# Patient Record
Sex: MALE | Race: WHITE
[De-identification: names, ages, dates, MRNs, and addresses within clinical notes are randomized per-mention and may not be internally consistent; named-entity substitution may affect disease eponyms.]

---

## 2019-03-28 ENCOUNTER — HOSPITAL ENCOUNTER (EMERGENCY)
Dept: HOSPITAL 75 - ER | Age: 39
Discharge: HOME | End: 2019-03-28
Payer: COMMERCIAL

## 2019-03-28 VITALS — HEIGHT: 68 IN | WEIGHT: 168 LBS | BODY MASS INDEX: 25.46 KG/M2

## 2019-03-28 VITALS — SYSTOLIC BLOOD PRESSURE: 134 MMHG | DIASTOLIC BLOOD PRESSURE: 93 MMHG

## 2019-03-28 DIAGNOSIS — L08.9: ICD-10-CM

## 2019-03-28 DIAGNOSIS — I89.1: ICD-10-CM

## 2019-03-28 DIAGNOSIS — Z91.041: ICD-10-CM

## 2019-03-28 DIAGNOSIS — S61.012A: Primary | ICD-10-CM
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
## 2019-03-28 PROCEDURE — 90715 TDAP VACCINE 7 YRS/> IM: CPT

## 2019-03-28 PROCEDURE — 99284 EMERGENCY DEPT VISIT MOD MDM: CPT

## 2019-03-28 NOTE — XMS REPORT
[facility]

 Created on: 2018



Rocael Purdy

External Reference #: [id number]

: 1980

Sex: Male



Demographics







 Address  [address] [place] [address]  [address]

 

 Preferred Language  Unknown

 

 Marital Status  Unknown

 

 Tenriism Affiliation  Unknown

 

 Race  Unknown

 

 Ethnic Group  Unknown





Author







 Author  FLAQUITO HANCOCK

 

 [facility]

 

 Address  [address] [place]  [address]



 

 Phone  [phone]







Care Team Providers







 Care Team Member Name  Role  Phone

 

 HANCOCK  FLAQUITO  Unavailable  [phone]







PROBLEMS







 Type  Condition  ICD9-CM Code  TZS58-EB Code  Onset Dates  Condition Status  
SNOMED Code

 

 Problem  Attention deficit hyperactivity disorder (ADHD), predominantly 
inattentive type     F90.0     Active  01837224

 

 Problem  Attention deficit hyperactivity disorder (ADHD), combined type     
F90.2     Active  26169891

 

 Problem  Carpal tunnel syndrome of right wrist     G56.01     Active  55376440

 

 Problem  Depression, unspecified depression type     F32.9     Active  20478424







ALLERGIES

No Information



ENCOUNTERS







 Encounter  Location  Date  Diagnosis

 

 [facility]  [address]65[address], [place] [address]
5[address]  04 Oct, 2018   

 

 [facility]  [id number] [place] [address]-
4635  30 Aug, 2018  Attention deficit hyperactivity disorder (ADHD), 
predominantly inattentive type F90.0

 

 [facility]  [address] [id number] [place] [address]  27 Aug
, 2018   

 

 [facility]  [address], [place] [address]  16 Aug, 2018  Attention deficit hyperactivity disorder (ADHD), combined 
type F90.2

 

 [facility]  [address] [facility] [id number] [id number]     

 

 [facility]  [address] [address]  23 May, 
2018  Depression, unspecified depression type F32.9 and Carpal tunnel syndrome 
of right wrist G56.01

 

 [facility]  [id number] [address]    Depression, unspecified depression type F32.9

 

 [facility]  [address] [id number] [address]
1432  14 2015   

 

 [facility]  [address] [facility] [id number] [address]
8[address]     

 

 [facility]  [address] [facility] [id number] [address]
9754  27 May, 2014   

 

 [facility]  [address] [facility] [id number] [address]3659-
8569  27 May, 2014   

 

 [facility]  [address] [facility] [id number] [address]  21 May, 2014   

 

 [facility]  [address] [facility] [id number] [address]163-
6497  21 May, 2014   

 

 [facility]  [address] [facility] [id number][place] [address]
3734  06 May, 2014   

 

 [facility]  [address]65100[place] [address]-
4816  06 May, 2014   

 

 [facility]  [address] [facility] [id number] [address]
5767     

 

 [facility]  [address] [id number] [address]
3886  14 Mar, 2014   

 

 [facility]  [address] [id number], [place] [address]
4122  14 Mar, 2014   







IMMUNIZATIONS

No Known Immunizations



SOCIAL HISTORY

Never Assessed



REASON FOR VISIT

referal



PLAN OF CARE





VITAL SIGNS





MEDICATIONS

Unknown Medications



RESULTS

No Results



PROCEDURES

No Known procedures



INSTRUCTIONS





MEDICATIONS ADMINISTERED

No Known Medications



MEDICAL (GENERAL) HISTORY







 Type  Description  Date

 

 Medical History  depression   

 

 Surgical History  carpal tunnel release bilat  2016   

 

 Surgical History  right knee arthroscopy

## 2019-03-28 NOTE — ED INTEGUMENTARY GENERAL
General


Chief Complaint:  Skin/Wound Problems


Stated Complaint:  L THUMB LAC


Source:  patient


Exam Limitations:  no limitations





History of Present Illness


Date Seen by Provider:  Mar 28, 2019


Time Seen by Provider:  18:49


Initial Comments


Left thumb. This occurred from a small scratch working on his car. Tetanus is 

not up-to-date. Initial injury was this morning. Over the day, thumbs become 

red swollen and tender.


Timing/Duration:  this morning


Severity:  mild


Associated Symptoms:  denies symptoms; No fever





Allergies and Home Medications


Allergies


Coded Allergies:  


     iodine (Verified  Allergy, Unknown, 3/28/19)





Patient Home Medication List


Home Medication List Reviewed:  Yes





Review of Systems


Review of Systems


Constitutional:  see HPI; No chills, No fever


EENTM:  see HPI


Respiratory:  no symptoms reported


Cardiovascular:  no symptoms reported


Genitourinary:  no symptoms reported


Musculoskeletal:  no symptoms reported


Skin:  see HPI


Psychiatric/Neurological:  No Symptoms Reported


Endocrine:  No Symptoms Reported


Hematologic/Lymphatic:  No Symptoms Reported





Past Medical-Social-Family Hx


Patient Social History


Alcohol Use:  Denies Use


Recreational Drug Use:  No


Smoking Status:  Never a Smoker


Recent Foreign Travel:  No


Contact w/Someone Who Travel:  No


Recent Hopitalizations:  No





Immunizations Up To Date


Tetanus Booster (TDap):  More than 5yrs





Seasonal Allergies


Seasonal Allergies:  No





Past Medical History


Surgeries:  Yes


Orthopedic


Respiratory:  No


Cardiac:  No


Neurological:  No


Genitourinary:  No


Gastrointestinal:  No


Musculoskeletal:  No


Endocrine:  No


HEENT:  Yes


Cancer:  No


Integumentary:  No





Physical Exam


Vital Signs


Capillary Refill :


General Appearance:  WD/WN, no apparent distress


HEENT:  PERRL/EOMI, normal ENT inspection


Neck:  non-tender, full range of motion


Respiratory:  no respiratory distress, no accessory muscle use


Neurologic/Psychiatric:  alert, normal mood/affect, oriented x 3


Skin:  normal color, warm/dry


Skin Problem Location:  upper extremities


Skin Problem Character:  other (there is a 1 cm laceration to the side of the 

proximal phalanx left thumb. This is shallow, wound edges are gaping by about 1-

2 mm. There is about 2 mm of ecchymosis surrounding this. There is erythema 

over this area as well as the thenar eminence, there is lymphangitis extending 

up the volar aspect of the left forearm to the left antecubital fossa region.)





Progress/Results/Core Measures


Results/Orders


My Orders





Orders - GREY SOLANO


Lidocaine 4% Topical (Xylocaine Topical (3/28/19 19:00)


Dipht,Pertuss(Acell),Tet Adult (Boostrix (3/28/19 19:00)


Ceftriaxone For Im Use (Rocephin For Im (3/28/19 19:00)


Lidocaine 1% Inj 20 Ml (Xylocaine 1% Inj (3/28/19 19:00)








Departure


Impression





 Primary Impression:  


 Wound infection


 Additional Impression:  


 Lymphangitis


Disposition:  01 HOME, SELF-CARE


Condition:  Stable





Departure-Patient Inst.


Decision time for Depature:  18:51


Referrals:  


[facility] (PCP)


Primary Care Physician








DO HINES (Family)


Primary Care Physician


Patient Instructions:  Wound Care, Wound Infection





Add. Discharge Instructions:  


1. Follow-up with your doctor on Monday for recheck. Return to ER for any 

increasing redness fevers chills or other concerns as if this significantly 

worsens, this may warrant intravenous antibiotics. Start the antibiotics 

tomorrow morning. All discharge instructions reviewed with patient and/or 

family. Voiced understanding.


Scripts


Cephalexin (Keflex) 500 Mg Capsule


500 MG PO Q6H, #28 CAP


   Prov: GREY SOLANO         3/28/19











GREY SOLANO Mar 28, 2019 18:52

## 2019-03-28 NOTE — XMS REPORT
[facility]

 Created on: 2018



PurdyRocael

External Reference #: [id number]

: 1980

Sex: Male



Demographics







 Address  [address] [place] [address]  [address]

 

 Preferred Language  Unknown

 

 Marital Status  Unknown

 

 Religion Affiliation  Unknown

 

 Race  Unknown

 

 Ethnic Group  Unknown





Author







 Author  URI LERNER

 

 Organization  [facility]

 

 Address  [address] [place]  [address]



 

 Phone  [phone]







Care Team Providers







 Care Team Member Name  Role  Phone

 

 ANEUDY LERNERWIN  Unavailable  [phone]







PROBLEMS







 Type  Condition  ICD9-CM Code  OJF89-DB Code  Onset Dates  Condition Status  
SNOMED Code

 

 Problem  Attention deficit hyperactivity disorder (ADHD), predominantly 
inattentive type     F90.0     Active  98180090

 

 Problem  Attention deficit hyperactivity disorder (ADHD), combined type     
F90.2     Active  45152250

 

 Problem  Carpal tunnel syndrome of right wrist     G56.01     Active  62306972

 

 Problem  Depression, unspecified depression type     F32.9     Active  97815386







ALLERGIES

No Information



ENCOUNTERS







 Encounter  Location  Date  Diagnosis

 

 [facility]  [address]00565100[place] [address]  14 Sep, 2018  Attention deficit hyperactivity disorder (ADHD), 
predominantly inattentive type F90.0

 

 [facility]  [address] [place] [address]-
0221  07 Sep, 2018   

 

 [place] [address]00565100[place] [address]-
3256  30 Aug, 2018  Attention deficit hyperactivity disorder (ADHD), 
predominantly inattentive type F90.0

 

 [facility]  [address] [id number] [place] [address]  27 Aug
, 2018   

 

 [facility]  [address] [facility] [id number] [place] [address]
1825  16 Aug, 2018  Attention deficit hyperactivity disorder (ADHD), combined 
type F90.2

 

 [facility]  [address] [facility] [id number] [address]     

 

 [facility]  [address] [facility] [id number][place] [address]  23 May, 
2018  Depression, unspecified depression type F32.9 and Carpal tunnel syndrome 
of right wrist G56.01

 

 [facility]  [address] [facility] [id number][place] [address]    Depression, unspecified depression type F32.9

 

 [facility]  [address]00565100[place] [address]-
7238     

 

 [facility]  [address] [id number] [address]-
[address]38     

 

 [facility]  [address] [id number] [address]-
1141  27 May, 2014   

 

 [facility]  [address] [id number] [address]
3560  27 May, 2014   

 

 [facility]  [address]00565100[place] [address]-
7423  21 May, 2014   

 

 [facility]  [address]00565100[place] [address]-
4939  21 May, 2014   

 

 [facility]  [address]00565100[place] [address]-
2151  06 May, 2014   

 

 [facility]  [address] [id number] [address]-
7472  06 May, 2014   

 

 [facility]  [address] [id number] [address]-
1402     

 

 [facility]  [address] [id number] [address]-
4325  14 Mar, 2014   

 

 [facility]  [address] [id number] [address]-
0194  14 Mar, 2014   







IMMUNIZATIONS

No Known Immunizations



SOCIAL HISTORY

Never Assessed



REASON FOR VISIT

 intake



PLAN OF CARE







 Activity  Details









  









 Follow Up  prn Reason:







VITAL SIGNS





MEDICATIONS

Unknown Medications



RESULTS

No Results



PROCEDURES







 Procedure  Date Ordered  Result  Body Site

 

 Psych diagnostic evaluation, established patient  Aug 16, 2018      







INSTRUCTIONS





MEDICATIONS ADMINISTERED

No Known Medications



MEDICAL (GENERAL) HISTORY







 Type  Description  Date

 

 Medical History  depression   

 

 Surgical History  carpal tunnel release bilat  2016   

 

 Surgical History  right knee arthroscopy  2015

## 2019-03-28 NOTE — XMS REPORT
[facility]

 Created on: 2018



MendezRocael

External Reference #: [id number]

: 1980

Sex: Male



Demographics







 Address  [address] [place] [address]  [address]

 

 Preferred Language  Unknown

 

 Marital Status  Unknown

 

 Spiritism Affiliation  Unknown

 

 Race  Unknown

 

 Ethnic Group  Unknown





Author







 Author  FLAQUITO HANCOCK

 

 [facility]

 

 Address  [address] [place]  [address]



 

 Phone  [phone]







Care Team Providers







 Care Team Member Name  Role  Phone

 

 FLAQUITO HANCOCK  Unavailable  [phone]







PROBLEMS







 Type  Condition  ICD9-CM Code  YOR83-GG Code  Onset Dates  Condition Status  
SNOMED Code

 

 Problem  Carpal tunnel syndrome of right wrist     G56.01     Active  69741623

 

 Problem  Depression, unspecified depression type     F32.9     Active  01365816

 

 Problem  Dental examination  V72.2        Active  24677859







ALLERGIES







 Substance  Reaction  Event Type  Date  Status

 

 Iodine  anaphylaxis  Drug Allergy    Active







ENCOUNTERS







 Encounter  Location  Date  Diagnosis

 

 [place] [address] [address] [address]-
[address]  16 Aug, 2018   

 

 [facility]  [address] [id number]  06 Aug, 
2018   

 

 [facility]  [address] [id number]     

 

 [facility]  [address] [id number]  23 May, 
2018  Depression, unspecified depression type F32.9 and Carpal tunnel syndrome 
of right wrist G56.01

 

 [facility]  [address] [id number]    Depression, unspecified depression type F32.9

 

 [facility]  [id number] [place] [address]-
[address]     

 

 [facility]  [address] [address]-
[address]     

 

 [facility]  [address] [address]-
0785  27 May, 2014   

 

 [facility]  [address] [address]-
9226  27 May, 2014   

 

 [facility]  [address] [facility] [id number] [address]-
4606  21 May, 2014   

 

 [facility]  [address] [id number] [address]-
2816  21 May, 2014   

 

 [facility]  [address] [facility] [id number][place] [address]-
9896  06 May, 2014   

 

 [facility]  [address] [id number] [address]-
3266  06 May, 2014   

 

 [facility]  [address] [facility] [id number][place] [address]-
7753     

 

 [facility]  [address] [id number] [address]-
3386  14 Mar, 2014   

 

 [facility]  [address] [id number] [address]-
6306  14 Mar, 2014   







IMMUNIZATIONS

No Known Immunizations



SOCIAL HISTORY

Never Assessed



REASON FOR VISIT

Depression, needs to establish care, but wants to start back on depression med 
today Austin SYLVESTER



PLAN OF CARE







 Activity  Details









  









 Follow Up  4 Weeks Reason:depression







VITAL SIGNS







 Height  69 in  2018

 

 Weight  168.6 lbs  2018

 

 Temperature  98.1 degrees Fahrenheit  2018

 

 Heart Rate  88 bpm  2018

 

 Respiratory Rate  18   2018

 

 BMI  24.90 kg/m2  2018

 

 Blood pressure systolic  102 mmHg  2018

 

 Blood pressure diastolic  72 mmHg  2018







MEDICATIONS







 Medication  Instructions  Dosage  Frequency  Start Date  End Date  Duration  
Status

 

 Fluoxetine 20 mg  Orally Once a day  1 capsule in the morning  24h       30 day(s)  Active







RESULTS

No Results



PROCEDURES

No Known procedures



INSTRUCTIONS





MEDICATIONS ADMINISTERED

No Known Medications



MEDICAL (GENERAL) HISTORY







 Type  Description  Date

 

 Medical History  depression   

 

 Surgical History  carpal tunnel release bilat  2016   

 

 Surgical History  right knee arthroscopy

## 2019-03-28 NOTE — XMS REPORT
[facility]

 Created on: 2018



PurdyRocael

External Reference #: [id number]

: 1980

Sex: Male



Demographics







 Address  [address] [place] [address]  [address]

 

 Preferred Language  Unknown

 

 Marital Status  Unknown

 

 Episcopal Affiliation  Unknown

 

 Race  Unknown

 

 Ethnic Group  Unknown





Author







 Author  URI LERNER

 

 Organization  [facility]

 

 Address  [address] [place]  [address]



 

 Phone  [phone]







Care Team Providers







 Care Team Member Name  Role  Phone

 

 ANEUDY LERNERWIN  Unavailable  [phone]







PROBLEMS







 Type  Condition  ICD9-CM Code  XCG61-QC Code  Onset Dates  Condition Status  
SNOMED Code

 

 Problem  Attention deficit hyperactivity disorder (ADHD), predominantly 
inattentive type     F90.0     Active  78982165

 

 Problem  Attention deficit hyperactivity disorder (ADHD), combined type     
F90.2     Active  86751644

 

 Problem  Carpal tunnel syndrome of right wrist     G56.01     Active  80843253

 

 Problem  Depression, unspecified depression type     F32.9     Active  31725355







ALLERGIES

No Information



ENCOUNTERS







 Encounter  Location  Date  Diagnosis

 

 [facility]  [address]00565100[place] [address]
0593  14 Sep, 2018  Attention deficit hyperactivity disorder (ADHD), 
predominantly inattentive type F90.0

 

 [facility]  [address] [place] [address]-
2494  07 Sep, 2018   

 

 [place] [address]00565100[place] [address]
8268  30 Aug, 2018  Attention deficit hyperactivity disorder (ADHD), 
predominantly inattentive type F90.0

 

 [facility]  [address] [id number] [place] [address]  27 Aug
, 2018   

 

 [facility]  [address] [facility] [id number][place] [address]-
7809  16 Aug, 2018  Attention deficit hyperactivity disorder (ADHD), combined 
type F90.2

 

 [facility]  [address] [facility] [id number] [place] [address]     

 

 [facility]  [address] [facility] [id number] Mountain [place] [address]  23 May, 
2018  Depression, unspecified depression type F32.9 and Carpal tunnel syndrome 
of right wrist G56.01

 

 [facility]  [address] [facility] [id number] [place] [address]    Depression, unspecified depression type F32.9

 

 [facility]  [address]00565100[place], [place] [address]-
[address]     

 

 [facility]  [address] [id number] [address]-
3246     

 

 [facility]  [address]00565100[place], [place] [address]-
7247  27 May, 2014   

 

 [facility]  [address] [id number] [address]  27 May, 2014   

 

 [facility]  [address]00565100[place], [place] [address]
0694  21 May, 2014   

 

 [facility]  [address]00565100[place], [place] [address]-
1595  21 May, 2014   

 

 [facility]  [address]00565100[place], [place] [address]-
5007  06 May, 2014   

 

 [facility]  [address] [id number], [place] [address]-
5282  06 May, 2014   

 

 [facility]  [address] [id number] [address]-
8661     

 

 [facility]  [address] [id number] [address]-
6519  14 Mar, 2014   

 

 [facility]  [address] [id number], [place] [address]-
0765  14 Mar, 2014   







IMMUNIZATIONS

No Known Immunizations



SOCIAL HISTORY

Never Assessed



REASON FOR VISIT

ADHD eval. 



PLAN OF CARE





VITAL SIGNS





MEDICATIONS

Unknown Medications



RESULTS

No Results



PROCEDURES

No Known procedures



INSTRUCTIONS





MEDICATIONS ADMINISTERED

No Known Medications



MEDICAL (GENERAL) HISTORY







 Type  Description  Date

 

 Medical History  depression   

 

 Surgical History  carpal tunnel release bilat  2016   

 

 Surgical History  right knee arthroscopy

## 2019-03-28 NOTE — XMS REPORT
[facility]

 Created on: 2018



Rocael Purdy

External Reference #: [id number]

: 1980

Sex: Male



Demographics







 Address  [address] [place] [address]  [address]

 

 Preferred Language  Unknown

 

 Marital Status  Unknown

 

 Jewish Affiliation  Unknown

 

 Race  Unknown

 

 Ethnic Group  Unknown





Author







 Author  DO HINES

 

 Organization  [facility]

 

 Address  [address] [place]  [address]



 

 Phone  [phone]







Care Team Providers







 Care Team Member Name  Role  Phone

 

 DO HINES  Unavailable  [phone]







PROBLEMS







 Type  Condition  ICD9-CM Code  MOA04-GQ Code  Onset Dates  Condition Status  
SNOMED Code

 

 Problem  Attention deficit hyperactivity disorder (ADHD), predominantly 
inattentive type     F90.0     Active  14326628

 

 Problem  Attention deficit hyperactivity disorder (ADHD), combined type     
F90.2     Active  58432899

 

 Problem  Carpal tunnel syndrome of right wrist     G56.01     Active  37069079

 

 Problem  Depression, unspecified depression type     F32.9     Active  55296263







ALLERGIES

No Information



ENCOUNTERS







 Encounter  Location  Date  Diagnosis

 

 [facility]  [id number], [place] [address]  04 Dec, 2018   

 

 [facility]  [address] [place] [address]
7[address]  14 Sep, 2018  Attention deficit hyperactivity disorder (ADHD), 
predominantly inattentive type F90.0

 

 [facility]  [address], [place] [address]
0963  07 Sep, 2018   

 

 [facility]  [address], [place] [address]  30 Aug, 2018  Attention deficit hyperactivity disorder (ADHD), 
predominantly inattentive type F90.0

 

 [facility] [address] [id number] [place] [address]  27 Aug
, 2018   

 

 [facility]  [address] [facility] [id number] [address]  16 Aug, 2018  Attention deficit hyperactivity disorder (ADHD), combined 
type F90.2

 

 [facility]  [address] [facility] [id number] [id number]     

 

 [facility]  [address] [facility] [id number] [id number]  23 May, 
2018  Depression, unspecified depression type F32.9 and Carpal tunnel syndrome 
of right wrist G56.01

 

 [facility]  [address] [id number] [id number]    Depression, unspecified depression type F32.9

 

 [facility]  [id number] [address]     

 

 [facility]  [address]65[address], [place] [address]     

 

 [facility]  [address]65[address], [place] [address]
1604  27 May, 2014   

 

 [facility]  [address] [address]
1576  27 May, 2014   

 

 [facility]  [address]65100[place], [place] [address]  21 May, 2014   

 

 [facility]  [address]65100[place], [place] [address]
7012  21 May, 2014   

 

 [facility]  [address]65100[place], [place] [address]  06 May, 2014   

 

 [facility]  [address]00565100[place], [place] [address]
1509  06 May, 2014   

 

 [facility]  [address] [id number], [place] [address]85     

 

 [facility]  [address]65100[place], [place] [address]
27[address]  14 Mar, 2014   

 

 [facility]  [id number], [place] [address]
3484  14 Mar, 2014   







IMMUNIZATIONS

No Known Immunizations



SOCIAL HISTORY

Never Assessed



REASON FOR VISIT

Medication refill request



PLAN OF CARE





VITAL SIGNS





MEDICATIONS







 Medication  Instructions  Dosage  Frequency  Start Date  End Date  Duration  
Status

 

 Wellbutrin  MG  Orally 2 times a day  1 tablet  12h  19 Sep, 2018     30 
day(s)  Active







RESULTS

No Results



PROCEDURES

No Known procedures



INSTRUCTIONS





MEDICATIONS ADMINISTERED

No Known Medications



MEDICAL (GENERAL) HISTORY







 Type  Description  Date

 

 Medical History  depression   

 

 Surgical History  carpal tunnel release bilat  2016   

 

 Surgical History  right knee arthroscopy

## 2019-03-28 NOTE — XMS REPORT
[facility]

 Created on: 2018



RajwinderDennyRocael

External Reference #: [id number]

: 1980

Sex: Male



Demographics







 Address  [address] [place] [address]  [address]

 

 Preferred Language  Unknown

 

 Marital Status  Unknown

 

 Mandaeism Affiliation  Unknown

 

 Race  Unknown

 

 Ethnic Group  Unknown





Author







 Author  FLAQUITO HANCOCK

 

 [facility]

 

 Address  [address] [place]  [address]



 

 Phone  [phone]







Care Team Providers







 Care Team Member Name  Role  Phone

 

 HANCOCK  FLAQUITO  Unavailable  [phone]







PROBLEMS







 Type  Condition  ICD9-CM Code  YXM80-XQ Code  Onset Dates  Condition Status  
SNOMED Code

 

 Problem  Attention deficit hyperactivity disorder (ADHD), combined type     
F90.2     Active  45249892

 

 Problem  Carpal tunnel syndrome of right wrist     G56.01     Active  41254974

 

 Problem  Depression, unspecified depression type     F32.9     Active  81897882

 

 Problem  Dental examination  V72.2        Active  46556890







ALLERGIES







 Substance  Reaction  Event Type  Date  Status

 

 Iodine  anaphylaxis  Drug Allergy  23 May, 2018  Active







ENCOUNTERS







 Encounter  Location  Date  Diagnosis

 

 [address] [id number] [place] [address]-
3564  30 Aug, 2018   

 

 [place] [address] [address]
65[address]  16 Aug, 2018  Attention deficit hyperactivity disorder (ADHD), combined 
type F90.2

 

 [facility]  [id number] [place] [id number]     

 

 [facility]  [id number] [place] [id number]  23 May, 
2018  Depression, unspecified depression type F32.9 and Carpal tunnel syndrome 
of right wrist G56.01

 

 [facility]  [id number] [place] [id number]    Depression, unspecified depression type F32.9

 

 [facility]  [address] [address]     

 

 [facility]  [address] [address]     

 

 [facility]  [address] [address]-
55[address]  27 May, 2014   

 

 [facility]  [address] [facility] [id number] [address]-
2546  27 May, 2014   

 

 [facility]  [address] [id number] [address]
2546  21 May, 2014   

 

 [facility]  [address] [id number] [address]
2546  21 May, 2014   

 

 [facility]  [address]65100[place] [address]
2546  06 May, 2014   

 

 [facility]  [address] [id number] [address]-
2546  06 May, 2014   

 

 [facility]  [address] [id number] [address]-
2546     

 

 [facility]  [address] [id number] [address]-
2546  14 Mar, 2014   

 

 [facility]  [address] [id number] [address]-
2546  14 Mar, 2014   







IMMUNIZATIONS

No Known Immunizations



SOCIAL HISTORY

Never Assessed



REASON FOR VISIT

1 month follow up on depression, doing well with medication. antonio Archibald, Also 
started new job and having issues with right wrist pain. Has HX of carpal 
tunnel. 



PLAN OF CARE







 Activity  Details









  









 Follow Up  2 Months Reason:depression







VITAL SIGNS







 Height  69 in  2018

 

 Weight  158 lbs  2018

 

 Temperature  97.5 degrees Fahrenheit  2018

 

 Heart Rate  74 bpm  2018

 

 Respiratory Rate  16   2018

 

 BMI  23.33 kg/m2  2018

 

 Blood pressure systolic  110 mmHg  2018

 

 Blood pressure diastolic  76 mmHg  2018







MEDICATIONS







 Medication  Instructions  Dosage  Frequency  Start Date  End Date  Duration  
Status

 

 Ibuprofen 800 MG  Orally Three times a day  1 tablet with food or milk as 
needed  8h  23 May, 2018        Active

 

 Fluoxetine 20 mg  Orally Once a day  1 capsule in the morning  24h          Active







RESULTS

No Results



PROCEDURES

No Known procedures



INSTRUCTIONS





MEDICATIONS ADMINISTERED

No Known Medications



MEDICAL (GENERAL) HISTORY







 Type  Description  Date

 

 Medical History  depression   

 

 Surgical History  carpal tunnel release bilat  2016   

 

 Surgical History  right knee arthroscopy

## 2019-03-28 NOTE — XMS REPORT
[facility]

 Created on: 2018



PurdyDennyRocael

External Reference #: [id number]

: 1980

Sex: Male



Demographics







 Address  [address] [place] [address]  [address]

 

 Preferred Language  Unknown

 

 Marital Status  Unknown

 

 Sabianism Affiliation  Unknown

 

 Race  Unknown

 

 Ethnic Group  Unknown





Author







 Author  DO HINES

 

 Organization  [facility]

 

 Address  [address] [place]  [address]



 

 Phone  [phone]







Care Team Providers







 Care Team Member Name  Role  Phone

 

 DO HINES  Unavailable  [phone]







PROBLEMS







 Type  Condition  ICD9-CM Code  RFV54-XC Code  Onset Dates  Condition Status  
SNOMED Code

 

 Problem  Attention deficit hyperactivity disorder (ADHD), predominantly 
inattentive type     F90.0     Active  02124483

 

 Problem  Attention deficit hyperactivity disorder (ADHD), combined type     
F90.2     Active  76718704

 

 Problem  Carpal tunnel syndrome of right wrist     G56.01     Active  40646655

 

 Problem  Depression, unspecified depression type     F32.9     Active  85587572







ALLERGIES

No Information



ENCOUNTERS







 Encounter  Location  Date  Diagnosis

 

 [facility]  [address]00565100[place], [place] [address]  14 Sep, 2018  Attention deficit hyperactivity disorder (ADHD), 
predominantly inattentive type F90.0

 

 [facility]  [address]00565100[place], [place] [address]
2244  07 Sep, 2018   

 

 [facility]  [address] [id number] [address]
1129  30 Aug, 2018  Attention deficit hyperactivity disorder (ADHD), 
predominantly inattentive type F90.0

 

 [facility] [address] [id number] [place] [address]  27 Aug
, 2018   

 

 [facility]  [address] [facility] [id number], [place] [address]-
7873  16 Aug, 2018  Attention deficit hyperactivity disorder (ADHD), combined 
type F90.2

 

 [facility]  [address] [id number] [id number]     

 

 [facility]  [address] [facility] [id number] [id number]  23 May, 
2018  Depression, unspecified depression type F32.9 and Carpal tunnel syndrome 
of right wrist G56.01

 

 [facility]  [address] [facility] [id number] [id number]    Depression, unspecified depression type F32.9

 

 [facility]  [address]00565100[place], [place] [address]-
[address]     

 

 [facility]  [address] [id number], [place] [address]     

 

 [facility]  [address]00565100[place], [place] [address]-
4872  27 May, 2014   

 

 [facility]  [address] [id number] [address]-
5591  27 May, 2014   

 

 [facility]  [address]00565100[place], [place] [address]-
7459  21 May, 2014   

 

 [facility]  [address]00565100[place], [place] [address]-
4275  21 May, 2014   

 

 [facility]  [address]00565100[place], [place] [address]-
8516  06 May, 2014   

 

 [facility]  [address] [id number], [place] [address]-
6345  06 May, 2014   

 

 [facility]  [address] [id number], [place] [address]-
25[address]     

 

 [facility]  [address] [id number], [place] [address]-
8373  14 Mar, 2014   

 

 [facility]  [address] [id number] [address]-
4481  14 Mar, 2014   







IMMUNIZATIONS

No Known Immunizations



SOCIAL HISTORY

Never Assessed



REASON FOR VISIT

Medication question



PLAN OF CARE





VITAL SIGNS





MEDICATIONS

Unknown Medications



RESULTS

No Results



PROCEDURES

No Known procedures



INSTRUCTIONS





MEDICATIONS ADMINISTERED

No Known Medications



MEDICAL (GENERAL) HISTORY







 Type  Description  Date

 

 Medical History  depression   

 

 Surgical History  carpal tunnel release bilat  2016   

 

 Surgical History  right knee arthroscopy

## 2019-03-28 NOTE — XMS REPORT
Continuity of Care Document

 Created on: 2019



LLUVIA ELKINS

External Reference #: [id number]

: 1980

Sex: Male



Demographics







 Home Phone  [phone]

 

 Preferred Language  Unknown

 

 Marital Status  Unknown

 

 Adventism Affiliation  Unknown

 

 Race  Unknown

 

 Ethnic Group  Unknown





Author







 Author  [facility]

 

 Organization  [facility]

 

 Address  Unknown

 

 Phone  Unavailable



              



Allergies

      





 Active            Description            Code            Type            
Severity            Reaction            Onset            Reported/Identified   
         Relationship to Patient            Clinical Status        

 

 Yes            acetaminophen            1605            Drug Allergy          
  N/A            N/A                                                   
Confirmed or Verified        

 

 Yes            hydrocodone            1554            Drug Allergy            N
/A            N/A                                                   Confirmed 
or Verified        

 

 Yes            iodine            852            Drug Allergy            Severe
            Hives                                                            

 

 Yes            iodine            852            Drug Allergy            N/A   
         N/A                                                   Confirmed or 
Verified        

 

 Yes            Vicodin            3413            Drug Allergy            
Severe            Hives                                                        
    

 

 Yes            iodine                         Drug Allergy            N/A     
       N/A                         2014                                  



                            



Medications

      



There is no data.                  



Problems

      





 Date Dx Coded            Attending            Type            Code            
Diagnosis            Diagnosed By        

 

 2014            DEYANIRA DDS, DEBRA B                         V72.2       
     DENTAL EXAMINATION                     

 

 2014            DEYANIRA DDS, DEBRA B                         V72.2       
     DENTAL EXAMINATION                     

 

 2014            DEYANIRA DDS, DEBRA B                         V72.2       
     DENTAL EXAMINATION                     

 

 2014            DEYANIRA DDS, DEBRA B                         V72.2       
     DENTAL EXAMINATION                     

 

 2014            DEYANIRA DDS, DEBRA B                         V72.2       
     DENTAL EXAMINATION                     

 

 2014            DEYANIRA DDS, DEBRA B                         V72.2       
     DENTAL EXAMINATION                     

 

 2014            DEYANIRA DDS, DEBRA B                         V72.2       
     DENTAL EXAMINATION                     

 

 2014            DEYANIRA DDS, DEBRA B                         V72.2       
     DENTAL EXAMINATION                     

 

 2014            DEYANIRA DDS, DEBRA B                         V72.2       
     DENTAL EXAMINATION                     

 

 2014            DEYANIRA DDS, DEBRA B                         V72.2       
     DENTAL EXAMINATION                     

 

 2014            DEYANIRA DDS, DEBRA B                         V72.2       
     DENTAL EXAMINATION                     

 

 2014            DEYANIRA DDS, DEBRA B                         V72.2       
     DENTAL EXAMINATION                     

 

 2014                         F            719.46            Pain, knee  
                   

 

 2014                         F            727.83            PLICA 
SYNDROME                     

 

 2014                         F            V54.89            OTHER 
ORTHOPEDIC AFTERCARE                     

 

 2015                         F            719.46            Pain, knee  
                   

 

 2015                         F            728.89            OTHER 
DISORDER OF MUSCLE, LIGAMENT, AND FASCIA                     



                                                  



Procedures

      





 Code            Description            Performed By            Performed On   
     

 

                                               LIMIT ORAL EVAL PROBLM 
FOCUS                                   2014        

 

                                               INTRAORAL PERIAPICAL FIRST 
F                                   2014        

 

                                               EXTRACTION ERUPTED TOOTH/
EXR                                   2014        

 

                                               NO CHARGE/FOLLOW UP        
                           2014        

 

                                               EXTRACTION ERUPTED TOOTH/
EXR                                   2014        

 

                                               NO CHARGE/FOLLOW UP        
                           2014        

 

             96971                                  ROUTINE VENIPUNCTURE       
                            2015        

 

             85079                                  METABOLIC PANEL TOTAL CA   
                                2015        

 

             06741                                  COMPLETE CBC W/AUTO DIFF 
WBC                                   2015        



                                  



Results

      





 Test            Result            Range        









 CBC WITH DIFF - 06/04/15 00:00         









 BASO%            0.2 %            0-2        

 

 EOS%            3.4 %            0-7.0        

 

 HCT            35.8 %            41.9-52.0        

 

 HGB            12.8 G/DL            13.0-18.0        

 

 LYMPH%            25.4 %            20-40        

 

 MCH            31.4 PG            27-31        

 

 MCHC            35.8 G/DL            33-37        

 

 MCV            87.7 FL            80-94        

 

 MONO%            10.5 %            0-10.0        

 

 MPV            9.8 FL            7.3-10.4        

 

 NEUTRO%            60.5 %            40-70        

 

 PLT            250 10^3u            130-400        

 

 RBC            4.1 10^6u            4.7-6.1        

 

 RDW            12.4 %            11.5-15.5        

 

 WBC            6.1 10^3u            4.8-10.8        

 

 NEUTRO#            3.7 10^3u            1.5-7.5        

 

 LYMPH#            1.6 10^3u            0.9-4.0        

 

 MONO#            0.6 10^3u            0-0.8        

 

 EOS#            0.2 10^3u            0-0.6        

 

 BASO#            0.0 10^3u            0-0.1        









 BMP - 06/04/15 00:00         









 BCR            13.2             10-20        

 

 BUN            16 MG/DL            7-18        

 

 CA            9.0 MG/DL            8.4-10.2        

 

 CL            103 MEQ/L                    

 

 CO2            31.6 MEQ/L            22-28        

 

 CREA            1.21 MG/DL            0.6-1.3        

 

 EGFR            69 eGFR            >=60        

 

 GLU            109 MG/DL                    

 

 K            3.7 MEQ/L            3.5-5.1        

 

 NA            143 MEQ/L            134-145        

 

 OSMSC            286.7 MOSML            280-300        

 

 Anion Gap            8.4             8-16        



                  



Encounters

      





 ACCT No.            Visit Date/Time            Discharge            Status    
        Pt. Type            Provider            Facility            Loc./Unit  
          Complaint        

 

 [id number]            2015 06:40:00            2015 09:00:00         
   DIS            Inpatient            DO LE            [facility]            OPS                     

 

 [id number]            2014 00:00:00                                   
   Document Registration                                                       
     

 

 [id number]            2015 08:00:00                                      
Document Registration                                                          
  

 

 [id number]            2014 10:45:00                                      
Document Registration                                                          
  

 

 [id number]            2014 15:25:00            2014 23:59:59          
  CLS            Outpatient            DEBRA MCMILLAN DDS                     
                          

 

 [id number]            2014 15:38:00            2014 23:59:59          
  CLS            Outpatient            DEBRA MCMILLAN [facility]                     
                          

 

 [id number]            2014 10:53:00            2014 23:59:59          
  CLS            Outpatient            DEBRA MCMILLAN D[facility]                     
                          

 

 [id number]            2019 09:00:00            2019 23:59:59            
CLS            Outpatient            DO CRUZ                      
   [facility]

## 2019-03-28 NOTE — XMS REPORT
Transition of Care Document

 Created on: 06/15/2015



LLUVIA ELKINS

External Reference #: [id number]

: 1980

Sex: Male



Demographics







 Address  [address] [place]  [address]

 

 Home Phone  [phone]

 

 Preferred Language  English

 

 Marital Status  Unknown

 

 Presybeterian Affiliation  Unknown

 

 Race  White

 

 Ethnic Group  Not  or 





Author







 Author  RANJANA"[employer] [facility] Staff

 

 Organization  [facility]

 

 Address  [address], [place]  [address]



 

 Phone  [phone]







Summary purpose

TRANSITION OF CARE AUTO GENERATION



Chief Complaint and Reason for Visit







 Admit Diagnosis 1  CARPAL TUNNEL SYNDROME







Problem list

No authorized problems tracked for continuity of care are available for this 
visit.



Encounters

No authorized problems tracked for encounter diagnoses are available for this 
visit.



Medications

No medications recorded for this patient visit



Allergies, adverse reactions, alerts







 Allergen  Category  Ingredient  Status  Reaction  Severity  Onset

 

 iodine  Drug Allergy  iodine  Confirmed or Verified  Hives  Severe  Adult

 

 Vicodin  Drug Allergy  Vicodin  Confirmed or Verified  Hives  Severe  Adult

 

 Vicodin  Drug Allergy  acetaminophen  Confirmed or Verified  Hives  Severe  
Adult

 

 Vicodin  Drug Allergy  hydrocodone  Confirmed or Verified  Hives  Severe  Adult







Immunizations

No immunizations recorded for this patient visit



Relevant diagnostic tests and/or laboratory data







 RESULTS 

 

 Chemistry 

 

 49-95-352041:55:00 

 

     Result  Normal Range  Units

 

 Sodium    143  134-145  mEq/l

 

 Potassium    3.7  3.5-5.1  mEq/l

 

 Chloride    103    mEq/l

 

 CO2  H  31.6  22-28  mEq/l

 

 Glucose  H  109    mg/dl

 

 BUN    16  7-18  mg/dl

 

 Creatinine    1.21  0.6-1.3  mg/dl

 

 Calcium    9.0  8.4-10.2  mg/dl

 

 Osmolality    286.7  280-300  mOsm/L

 

 Anion GAP    8.4  8-16  

 

 BUN/Creatinine Ratio    13.2  10-20  

 

 Estimated GFR    69  >=60  mL/min/1.7

 

  

 

 Hematology 

 

 87-20-190915:55:00 

 

     Result  Normal Range  Units

 

 WBC    6.1  4.8-10.8  103/uL

 

 RBC  L  4.1  4.7-6.1  106/uL

 

 HGB  L  12.8  13.0-18.0  g/dl

 

 HCT  L  35.8  41.9-52.0  %

 

 MCV    87.7  80-94  FL

 

 MCH  H  31.4  27-31  pg

 

 MCHC    35.8  33-37  g/dl

 

 RDW    12.4  11.5-15.5  %

 

 PLT    250  130-400  103/uL

 

 MPV    9.8  7.3-10.4  FL

 

 Neutro %    60.5  40-70  %

 

 Lymph %    25.4  20-40  %

 

 Mono %  H  10.5  0-10.0  %

 

 Eos %    3.4  0-7.0  %

 

 Baso %    0.2  0-2  %

 

 Neutro #    3.7  1.5-7.5  103/uL

 

 Lymph #    1.6  0.9-4.0  103/uL

 

 Mono #    0.6  0-0.8  103/uL

 

 Eos #    0.2  0-0.6  103/uL

 

 Baso #    0.0  0-0.1  103/uL

 

  

 

 Radiology Results 

 

 76-89-181018:55:00 

 

     Result  Normal Range  Units

 

 MPV    9.8  7.3-10.4  FL







History of procedures







 Procedure Code  Code Type  Description  Date Performed  Performing Physician

 

 .43  ICD9-CM  CARPAL TUNNEL RELEASE  2015   

 

 41343  CPT-4  CARPAL TUNNEL SURGERY  2015  DO LE

 

   CPT-4  CEFAZOLIN SODIUM INJECTION  2015  DO LE

 

   CPT-4  INJ MIDAZOLAM HYDROCHLORIDE  2015  DO LE

 

   CPT-4  INJ, PROPOFOL, 10 MG  2015  DO LE

 

   CPT-4  FENTANYL CITRATE INJECITON  2015  DO LE

 

   CPT-4  RINGERS LACTATE INFUSION  2015  DO LE







Functional status







 Functional Status  Finding  Observation Time

 

 Hearing Prob Loc  none  67-16-292366:01

 

 Vision Problems  yes  44-43-863046:01

 

 Vision Correct Dev  contacts  55-38-746546:01

 

 Ambulation Asst Dev  none  67-00-084934:01

 

 Range of Motion  full  95-61-947122:15

 

 Muscle Strength RUE  5 ROM full resist  05-09-399830:15

 

 Muscle Strength RLE  5 ROM full resist  86-48-252880:15

 

 Muscle Strength LUE  5 ROM full resist  58-16-959908:15

 

 Muscle Strength LLE  5 ROM full resist  66-14-458065:15

 

 Transfers  assist x 1  14-87-746546:15

 

 Ambulation  in room  17-38-750345:15

 

 Balance  steady  14-50-086284:15

 

 Bathing Assistance  none  89-15-257278:01

 

 Eating Assistance  none  :01

 

 Dressing Assistance  none  58-36-199671:01

 

 Toileting Assistance  none  :

 

 Transfer Assistance  none  :

 

 Decline Slf Care/Mob  no  :

 

 Phys Cond Stable  yes  :01

 

 Nutrition  normal  70-18-602751:15

 

 Diet  regular  21-28-266925:15

 

 Oral Cavity  moist and intact  :15

 

 Teeth  none  :15

 

 Dental Hygiene  good  56-96-334830:15

 

 Abdomen Appearance  flat  50-04-671308:15

 

 Abdomen  soft  70-40-003356:15

 

 Bowel Sounds  present  :15

 

 NG Tube  no  :15

 

 Feeding Tube  none  :15

 

 Smith  no  74-36-500904:15

 

 Cont Bladder Irr  no  39-95-332146:15

 

 Ostomy  no  83-87-238771:15

 

 Stool  normal  50-09-474282:15

 

 Urination  normal  97-18-071438:15

 

 Quality  sym/unlabored  71-48-955102:15

 

 Cough  absent  45-81-912325:15

 

 Secretions  no  46-06-418317:15

 

 Breath Sounds RUL  clear  06-74-907864:15

 

 Breath Sounds RML  clear  24-08-511842:15

 

 Breath Sounds RLL  clear  44-45-040608:15

 

 Breath Sounds CRISTINE  clear  96-61-467893:15

 

 Breath Sounds LLL  clear  00-13-410990:15

 

 Airway  natural  19-73-116968:15

 

 Oxygen  no  73-54-203326:45

 

 C-PAP  no  36-46-760525:15

 

 BI-PAP  no  49-76-519869:15

 

 Temp >100.4  no  56-37-743328:15

 

 Temp <96.8  no  25-75-669696:15

 

 Chills with rigors  no  67-48-376148:15

 

 HR > 90bpm  no  05-31-232794:15

 

 Respirations > 20  no  51-38-124570:15

 

 Systolic <90  no  43-74-634518:15

 

 headache stiff neck  no  51-20-540065:15

 

 Rapid Resp  no  69-84-433266:15

 

 IV Site Location  Lt ac   23-24-621679:45

 

 IV Type  peripheral  46-41-260026:45

 

 IV Site Information  discontinued  76-25-827398:45

 

 IV Site Start Attmpt  1 times  27-59-634466:27

 

 IV Site Louis  20  56-80-957723:15

 

 IV Site Appearance  WNL  41-09-126709:15

 

 IV Site Color  clear  30-07-650788:15

 

 IV Site Patent  yes  24-02-166597:15

 

 Dressing Type  occlusive  76-75-038786:15

 

 Nursing Note  Pt dc'd to home in stable condition ambulatory with family in 
personal vehical.

Belongings intact.  80-65-714037:00

 

 Cognitive Status  Finding  Observation Time

 

 Learning Ability  comprehends well  67-19-835040:45

 

 Neurological  no  58-47-910514:45

 

 Psychological  no  09-59-840153:45

 

 Physical  no  35-24-309926:45

 

 Hearing  no  80-25-702111:45

 

  Needed  no  95-66-427049:45

 

 Sign Language  no  13-21-255246:45

 

 Emotional  no  73-06-008155:45

 

 Vision  yes  70-39-694349:45

 

 Laguage  no  68-14-752573:45

 

 Financial  no  68-19-528419:45







Vital signs







 Type  Value  Date

 

 Respiration Rate  18breaths per minute  93-66-308449:45

 

 Pulse  69beats per minute  :45

 

 Oxygen Saturation  100%  66-01-485317:45

 

 BP Systolic  93mmHg  33-96-826759:45

 

 BP Diastolic  80mmHg  67-00-426191:45

 

 Temperature  97.6F  62-62-718436:11

 

 Height  68.5inches  27-41-612257:29

 

 Weight  167LB  17-54-339433:29







Social history







 Type  Value

 

 Smoking Status  NEVER SMOKER







Treatment Plan

No treatment plan text is available for this visit.



Hospital discharge instructions







 Discharge Date/Time  6/05/15    0900 

 

 Accompanied By  wife 

 

 Relationship  spouse/signif other

 

 Dismissal Condition  good

 

 Disposition on DC  home

 

 Valuables  no

 

 DC Inst/Educ Give  yes

 

 Exit Care Educ Given  yes

 

 Med/Side Effects Rev  yes

 

 PNE Vac  Never 

 

 Flu Vac  Never 

 

 Tetanus Vac   

 

 Medical Equipment  wrist splint 

 

 Diet Explained  yes

 

 Follow up appt  already scheduled

 

 Follow Up Appt D/T  6/18/15    6412

## 2019-03-28 NOTE — XMS REPORT
Transition of Care Document

 Created on: 2015



LLUVIA ELKINS

External Reference #: [id number]

: 1980

Sex: Male



Demographics







 Address  [address] [place]  [address]

 

 Home Phone  [phone]

 

 Preferred Language  English

 

 Marital Status  Unknown

 

 Spiritism Affiliation  Unknown

 

 Race  White

 

 Ethnic Group  Not  or 





Author







 Author  [facility]

 

 Organization  [facility]

 

 Address  [address], [place]  [address]



 

 Phone  [phone]







Summary purpose

TRANSITION OF CARE AUTO GENERATION



Chief Complaint and Reason for Visit







 Admit Diagnosis 1  RIGHT CARPAL TUNNEL RELEASE







Problem list

No authorized problems tracked for continuity of care are available for this 
visit.



Encounters

No authorized problems tracked for encounter diagnoses are available for this 
visit.



Medications

No medications recorded for this patient visit



Allergies, adverse reactions, alerts







 Allergen  Category  Ingredient  Status  Reaction  Severity  Onset

 

 iodine  Drug Allergy  iodine  Confirmed or Verified  Hives  Severe  Adult

 

 Vicodin  Drug Allergy  Vicodin  Confirmed or Verified  Hives  Severe  Adult

 

 Vicodin  Drug Allergy  acetaminophen  Confirmed or Verified  Hives  Severe  
Adult

 

 Vicodin  Drug Allergy  hydrocodone  Confirmed or Verified  Hives  Severe  Adult







Immunizations

No immunizations recorded for this patient visit



Relevant diagnostic tests and/or laboratory data







 RESULTS 

 

 Chemistry 

 

 89-61-119748:55:00 

 

     Result  Normal Range  Units

 

 Sodium    143  134-145  mEq/l

 

 Potassium    3.7  3.5-5.1  mEq/l

 

 Chloride    103    mEq/l

 

 CO2  H  31.6  22-28  mEq/l

 

 Glucose  H  109    mg/dl

 

 BUN    16  7-18  mg/dl

 

 Creatinine    1.21  0.6-1.3  mg/dl

 

 Calcium    9.0  8.4-10.2  mg/dl

 

 Osmolality    286.7  280-300  mOsm/L

 

 Anion GAP    8.4  8-16  

 

 BUN/Creatinine Ratio    13.2  10-20  

 

 Estimated GFR    69  >=60  mL/min/1.7

 

  

 

 Hematology 

 

 54-40-689263:55:00 

 

     Result  Normal Range  Units

 

 WBC    6.1  4.8-10.8  103/uL

 

 RBC  L  4.1  4.7-6.1  106/uL

 

 HGB  L  12.8  13.0-18.0  g/dl

 

 HCT  L  35.8  41.9-52.0  %

 

 MCV    87.7  80-94  FL

 

 MCH  H  31.4  27-31  pg

 

 MCHC    35.8  33-37  g/dl

 

 RDW    12.4  11.5-15.5  %

 

 PLT    250  130-400  103/uL

 

 MPV    9.8  7.3-10.4  FL

 

 Neutro %    60.5  40-70  %

 

 Lymph %    25.4  20-40  %

 

 Mono %  H  10.5  0-10.0  %

 

 Eos %    3.4  0-7.0  %

 

 Baso %    0.2  0-2  %

 

 Neutro #    3.7  1.5-7.5  103/uL

 

 Lymph #    1.6  0.9-4.0  103/uL

 

 Mono #    0.6  0-0.8  103/uL

 

 Eos #    0.2  0-0.6  103/uL

 

 Baso #    0.0  0-0.1  103/uL

 

  

 

 Radiology Results 

 

 43-66-326327:55:00 

 

     Result  Normal Range  Units

 

 MPV    9.8  7.3-10.4  FL







History of procedures







 Procedure Code  Code Type  Description  Date Performed  Performing Physician

 

 28764  CPT-4  COMPLETE CBC W/AUTO DIFF WBC  2015  DO LE

 

 62497  CPT-4  METABOLIC PANEL TOTAL CA  2015  DO LE

 

 38617  CPT-4  ROUTINE VENIPUNCTURE  2015  DO LE







Functional status







 Functional Status  Finding  Observation Time

 

 Hearing Prob Loc  none  00-62-891040:01

 

 Vision Problems  yes  88-49-971455:01

 

 Vision Correct Dev  contacts  17-98-898983:01

 

 Ambulation Asst Dev  none  09-93-822270:01

 

 Range of Motion  full  52-69-675506:15

 

 Muscle Strength RUE  5 ROM full resist  88-56-233489:15

 

 Muscle Strength RLE  5 ROM full resist  60-28-332524:15

 

 Muscle Strength LUE  5 ROM full resist  51-28-750623:15

 

 Muscle Strength LLE  5 ROM full resist  69-52-726905:15

 

 Transfers  assist x 1  36-69-226567:15

 

 Ambulation  in room  27-32-876467:15

 

 Balance  steady  65-84-972320:15

 

 Bathing Assistance  none  49-03-744782:01

 

 Eating Assistance  none  48-69-121273:01

 

 Dressing Assistance  none  46-23-779532:01

 

 Toileting Assistance  none  33-39-075091:01

 

 Transfer Assistance  none  16-20-610500:01

 

 Decline Slf Care/Mob  no  15-97-682164:01

 

 Phys Cond Stable  yes  92-16-026793:01

 

 Nutrition  normal  75-26-865617:15

 

 Diet  regular  93-57-563568:15

 

 Oral Cavity  moist and intact  62-22-387316:15

 

 Teeth  none  :15

 

 Dental Hygiene  good  47-57-906018:15

 

 Abdomen Appearance  flat  91-51-071637:15

 

 Abdomen  soft  03-21-630797:15

 

 Bowel Sounds  present  43-92-850909:15

 

 NG Tube  no  45-88-961737:15

 

 Feeding Tube  none  98-88-998751:15

 

 Smith  no  57-27-165785:15

 

 Cont Bladder Irr  no  27-34-315805:15

 

 Ostomy  no  42-09-623722:15

 

 Stool  normal  85-21-993782:15

 

 Urination  normal  09-22-259527:15

 

 Quality  sym/unlabored  65-55-685918:15

 

 Cough  absent  88-96-764075:15

 

 Secretions  no  13-14-089901:15

 

 Breath Sounds RUL  clear  62-72-501627:15

 

 Breath Sounds RML  clear  20-34-581855:15

 

 Breath Sounds RLL  clear  97-74-249558:15

 

 Breath Sounds CRISTINE  clear  49-60-247706:15

 

 Breath Sounds LLL  clear  22-21-311570:15

 

 Airway  natural  86-27-136980:15

 

 Oxygen  no  44-87-363866:45

 

 C-PAP  no  70-21-306560:15

 

 BI-PAP  no  74-48-982138:15

 

 Temp >100.4  no  89-42-508492:15

 

 Temp <96.8  no  00-84-002795:15

 

 Chills with rigors  no  72-30-183218:15

 

 HR > 90bpm  no  73-16-199457:15

 

 Respirations > 20  no  45-17-338308:15

 

 Systolic <90  no  39-65-585021:15

 

 headache stiff neck  no  73-99-141386:15

 

 Rapid Resp  no  58-43-179739:15

 

 IV Site Location  Lt ac   :45

 

 IV Type  peripheral  :45

 

 IV Site Information  discontinued  :45

 

 IV Site Start Attmpt  1 times  20-99-902957:27

 

 IV Site Louis  20  39-24-996310:15

 

 IV Site Appearance  WNL  73-00-164500:15

 

 IV Site Color  clear  :15

 

 IV Site Patent  yes  :15

 

 Dressing Type  occlusive  :15

 

 Nursing Note  Pt dc'd to home in stable condition ambulatory with family in 
personal vehical.

Belongings intact.  37-16-240924:00

 

 Cognitive Status  Finding  Observation Time

 

 Learning Ability  comprehends well  :45

 

 Neurological  no  20-96-880310:45

 

 Psychological  no  58-07-497388:45

 

 Physical  no  91-64-314197:45

 

 Hearing  no  :45

 

  Needed  no  :45

 

 Sign Language  no  :45

 

 Emotional  no  :45

 

 Vision  yes  :45

 

 Laguage  no  :45

 

 Financial  no  :45







Vital signs







 Type  Value  Date

 

 Respiration Rate  18breaths per minute  :45

 

 Pulse  69beats per minute  :45

 

 Oxygen Saturation  100%  :45

 

 BP Systolic  93mmHg  18-03-967393:45

 

 BP Diastolic  80mmHg  02-69-137015:45

 

 Temperature  97.6F  85-48-760531:11

 

 Height  68.5inches  23-52-606760:29

 

 Weight  167LB  56-47-436409:29







Social history







 Type  Value

 

 Smoking Status  NEVER SMOKER







Treatment Plan

No treatment plan text is available for this visit.



Hospital discharge instructions







 Discharge Date/Time  6/05/15    0900 

 

 Accompanied By  wife 

 

 Relationship  spouse/signif other

 

 Dismissal Condition  good

 

 Disposition on DC  home

 

 Valuables  no

 

 DC Inst/Educ Give  yes

 

 Exit Care Educ Given  yes

 

 Med/Side Effects Rev  yes

 

 PNE Vac  Never 

 

 Flu Vac  Never 

 

 Tetanus Vac   

 

 Medical Equipment  wrist splint 

 

 Diet Explained  yes

 

 Follow up appt  appt made (specify)

 

 Follow Up Appt D/T  6/18/15    8896

## 2019-03-28 NOTE — XMS REPORT
[facility]

 Created on: 2018



RajwinderDennyRocael

External Reference #: [id number]

: 1980

Sex: Male



Demographics







 Address  [address] [place] [address]  [address]

 

 Preferred Language  Unknown

 

 Marital Status  Unknown

 

 Gnosticism Affiliation  Unknown

 

 Race  Unknown

 

 Ethnic Group  Unknown





Author







 Author  DO HINES

 

 Organization  [facility]

 

 Address  [address] [place]  [address]



 

 Phone  [phone]







Care Team Providers







 Care Team Member Name  Role  Phone

 

 DO HINES  Unavailable  [phone]







PROBLEMS







 Type  Condition  ICD9-CM Code  NZI71-BX Code  Onset Dates  Condition Status  
SNOMED Code

 

 Problem  Attention deficit hyperactivity disorder (ADHD), predominantly 
inattentive type     F90.0     Active  32701483

 

 Problem  Attention deficit hyperactivity disorder (ADHD), combined type     
F90.2     Active  41580338

 

 Problem  Carpal tunnel syndrome of right wrist     G56.01     Active  76327103

 

 Problem  Depression, unspecified depression type     F32.9     Active  60110248







ALLERGIES







 Substance  Reaction  Event Type  Date  Status

 

 Iodine  anaphylaxis  Drug Allergy  30 Aug, 2018  Active







ENCOUNTERS







 Encounter  Location  Date  Diagnosis

 

 [facility]  [id number], [place] [address]  14 Sep, 2018  Attention deficit hyperactivity disorder (ADHD), 
predominantly inattentive type F90.0

 

 [facility]  [id number] [place] [address]  07 Sep, 2018   

 

 [facility]  [id number] [place] [address]  30 Aug, 2018  Attention deficit hyperactivity disorder (ADHD), 
predominantly inattentive type F90.0

 

 [facility]  [id number] [place] [address]  27 Aug
, 2018   

 

 [facility]  [address] [facility] [id number] [address]  16 Aug, 2018  Attention deficit hyperactivity disorder (ADHD), combined 
type F90.2

 

 [facility]  [address] [id number] [id number]     

 

 [facility]  [address] [facility] [id number] [id number]  23 May, 
2018  Depression, unspecified depression type F32.9 and Carpal tunnel syndrome 
of right wrist G56.01

 

 [facility]  [address] [facility] [address] [id number] [id number]    Depression, unspecified depression type F32.9

 

 [facility]  [id number], [place] [address]     

 

 [facility]  [id number] [address]     

 

 [facility]  [id number] [address]  27 May, 2014   

 

 [facility]  [address] [place] [address]  27 May, 2014   

 

 [facility]  [id number] [address]  21 May, 2014   

 

 [facility]  [id number] [address]  21 May, 2014   

 

 [facility]  [id number] [address]  06 May, 2014   

 

 [facility]  [address] [id number] [address]  06 May, 2014   

 

 [facility]  [id number] [address]     

 

 [facility]  [id number] [address]  14 Mar, 2014   

 

 [facility]  [id number], [place] [address]  14 Mar, 2014   







IMMUNIZATIONS

No Known Immunizations



SOCIAL HISTORY

Never Assessed



REASON FOR VISIT

New provider visit [facility], Was seeing Dr. Patel in [place]



PLAN OF CARE







 Activity  Details









  









 Follow Up  4 Weeks Reason:adhd







VITAL SIGNS







 Height  69 in  2018

 

 Weight  164.4 lbs  2018

 

 Temperature  97 degrees Fahrenheit  2018

 

 Heart Rate  58 bpm  2018

 

 Respiratory Rate  18   2018

 

 BMI  24.27 kg/m2  2018

 

 Blood pressure systolic  122 mmHg  2018

 

 Blood pressure diastolic  74 mmHg  2018







MEDICATIONS







 Medication  Instructions  Dosage  Frequency  Start Date  End Date  Duration  
Status

 

 Fluoxetine 20 mg  Orally Once a day  1 capsule in the morning  24h       Not-Taking

 

 Adderall 10 mg  Orally Twice a day  1 tablet  12h  30 Aug, 2018        Active

 

 Ibuprofen 800 MG  Orally Three times a day  1 tablet with food or milk as 
needed  8h  23 May, 2018        Active







RESULTS

No Results



PROCEDURES

No Known procedures



INSTRUCTIONS





MEDICATIONS ADMINISTERED

No Known Medications



MEDICAL (GENERAL) HISTORY







 Type  Description  Date

 

 Medical History  depression   

 

 Surgical History  carpal tunnel release bilat  2016   

 

 Surgical History  right knee arthroscopy